# Patient Record
Sex: FEMALE | Race: WHITE | Employment: UNEMPLOYED | URBAN - METROPOLITAN AREA
[De-identification: names, ages, dates, MRNs, and addresses within clinical notes are randomized per-mention and may not be internally consistent; named-entity substitution may affect disease eponyms.]

---

## 2017-02-13 LAB
HPV COMMENT: ABNORMAL
HPV TYPE 16: NOT DETECTED
HPV TYPE 18: NOT DETECTED
HPVOH (OTHER TYPES): DETECTED

## 2019-06-09 ENCOUNTER — HOSPITAL ENCOUNTER (EMERGENCY)
Age: 34
Discharge: HOME | End: 2019-06-09
Attending: EMERGENCY MEDICINE
Payer: COMMERCIAL

## 2019-06-09 VITALS
WEIGHT: 125.66 LBS | HEIGHT: 66 IN | HEART RATE: 91 BPM | RESPIRATION RATE: 25 BRPM | DIASTOLIC BLOOD PRESSURE: 72 MMHG | TEMPERATURE: 98 F | BODY MASS INDEX: 20.2 KG/M2 | OXYGEN SATURATION: 100 % | SYSTOLIC BLOOD PRESSURE: 112 MMHG

## 2019-06-09 DIAGNOSIS — T18.108A FOREIGN BODY IN ESOPHAGUS, INITIAL ENCOUNTER: Primary | ICD-10-CM

## 2019-06-09 LAB — HCG(URINE) PREGNANCY TEST: NEGATIVE

## 2019-06-09 PROCEDURE — 6360000002 HC RX W HCPCS: Performed by: INTERNAL MEDICINE

## 2019-06-09 PROCEDURE — 2580000003 HC RX 258: Performed by: EMERGENCY MEDICINE

## 2019-06-09 PROCEDURE — 2709999900 HC NON-CHARGEABLE SUPPLY: Performed by: INTERNAL MEDICINE

## 2019-06-09 PROCEDURE — 3609012400 HC EGD TRANSORAL BIOPSY SINGLE/MULTIPLE: Performed by: INTERNAL MEDICINE

## 2019-06-09 PROCEDURE — 3609012900 HC EGD FOREIGN BODY REMOVAL: Performed by: INTERNAL MEDICINE

## 2019-06-09 PROCEDURE — 96375 TX/PRO/DX INJ NEW DRUG ADDON: CPT

## 2019-06-09 PROCEDURE — 99284 EMERGENCY DEPT VISIT MOD MDM: CPT

## 2019-06-09 PROCEDURE — 88305 TISSUE EXAM BY PATHOLOGIST: CPT

## 2019-06-09 PROCEDURE — 6360000002 HC RX W HCPCS: Performed by: EMERGENCY MEDICINE

## 2019-06-09 PROCEDURE — 96374 THER/PROPH/DIAG INJ IV PUSH: CPT

## 2019-06-09 PROCEDURE — 99152 MOD SED SAME PHYS/QHP 5/>YRS: CPT | Performed by: INTERNAL MEDICINE

## 2019-06-09 PROCEDURE — 88313 SPECIAL STAINS GROUP 2: CPT

## 2019-06-09 PROCEDURE — 84703 CHORIONIC GONADOTROPIN ASSAY: CPT

## 2019-06-09 RX ORDER — MIDAZOLAM HYDROCHLORIDE 1 MG/ML
INJECTION INTRAMUSCULAR; INTRAVENOUS
Status: COMPLETED | OUTPATIENT
Start: 2019-06-09 | End: 2019-06-09

## 2019-06-09 RX ORDER — MEPERIDINE HYDROCHLORIDE 100 MG/ML
100 INJECTION INTRAMUSCULAR; INTRAVENOUS; SUBCUTANEOUS ONCE
Status: COMPLETED | OUTPATIENT
Start: 2019-06-09 | End: 2019-06-09

## 2019-06-09 RX ORDER — MEPERIDINE HYDROCHLORIDE 25 MG/ML
INJECTION INTRAMUSCULAR; INTRAVENOUS; SUBCUTANEOUS
Status: COMPLETED | OUTPATIENT
Start: 2019-06-09 | End: 2019-06-09

## 2019-06-09 RX ORDER — MORPHINE SULFATE 2 MG/ML
4 INJECTION, SOLUTION INTRAMUSCULAR; INTRAVENOUS ONCE
Status: COMPLETED | OUTPATIENT
Start: 2019-06-09 | End: 2019-06-09

## 2019-06-09 RX ORDER — ONDANSETRON 2 MG/ML
4 INJECTION INTRAMUSCULAR; INTRAVENOUS ONCE
Status: COMPLETED | OUTPATIENT
Start: 2019-06-09 | End: 2019-06-09

## 2019-06-09 RX ORDER — SODIUM CHLORIDE 9 MG/ML
INJECTION, SOLUTION INTRAVENOUS CONTINUOUS
Status: DISCONTINUED | OUTPATIENT
Start: 2019-06-09 | End: 2019-06-09 | Stop reason: HOSPADM

## 2019-06-09 RX ADMIN — MEPERIDINE HYDROCHLORIDE 100 MG: 100 INJECTION, SOLUTION INTRAMUSCULAR; INTRAVENOUS; SUBCUTANEOUS at 10:10

## 2019-06-09 RX ADMIN — ONDANSETRON 4 MG: 2 INJECTION INTRAMUSCULAR; INTRAVENOUS at 08:00

## 2019-06-09 RX ADMIN — MORPHINE SULFATE 4 MG: 2 INJECTION, SOLUTION INTRAMUSCULAR; INTRAVENOUS at 07:59

## 2019-06-09 RX ADMIN — SODIUM CHLORIDE: 9 INJECTION, SOLUTION INTRAVENOUS at 08:00

## 2019-06-09 ASSESSMENT — PAIN DESCRIPTION - PAIN TYPE: TYPE: ACUTE PAIN

## 2019-06-09 ASSESSMENT — PAIN - FUNCTIONAL ASSESSMENT: PAIN_FUNCTIONAL_ASSESSMENT: 0-10

## 2019-06-09 ASSESSMENT — PAIN DESCRIPTION - DESCRIPTORS: DESCRIPTORS: NAGGING

## 2019-06-09 ASSESSMENT — PAIN SCALES - GENERAL
PAINLEVEL_OUTOF10: 10
PAINLEVEL_OUTOF10: 0
PAINLEVEL_OUTOF10: 10
PAINLEVEL_OUTOF10: 0

## 2019-06-09 ASSESSMENT — PAIN DESCRIPTION - FREQUENCY: FREQUENCY: CONTINUOUS

## 2019-06-09 ASSESSMENT — PAIN DESCRIPTION - LOCATION: LOCATION: STERNUM

## 2019-06-09 NOTE — H&P
[address] [place] [address]   Pre-operative History and Physical    Patient: Jim Bhatia  : 1985     History Obtained From:  patient    HISTORY OF PRESENT ILLNESS:    The patient is a 29 y.o. female who presents for an EGD for esophageal foreign body. She was eating steak last night and it got stuck. One prior incident like this with chicken and she was able to vomit it up. She has frequent heartburn - not previously evaluated and ross not take medicine for it. Denies any food allergies. Past Medical History:        Diagnosis Date    Anxiety      Past Surgical History:    History reviewed. No pertinent surgical history. Medications Prior to Admission:   No current facility-administered medications on file prior to encounter. No current outpatient medications on file prior to encounter. Allergies:  Pcn [penicillins]     History of allergic reaction to anesthesia:  No    Social History:   Nonsmoker, occasional EtOH    Family History:   Noncontributory    PHYSICAL EXAM:      /78   Pulse 80   Temp 98 °F (36.7 °C)   Resp 16   Ht 5' 6\" (1.676 m)   Wt 125 lb 10.6 oz (57 kg)   LMP 2019   SpO2 100%   BMI 20.28 kg/m²  I        Heart:  No m/r/g +s1/s2 RRR    Lungs:  CTA bilaterally    Abdomen:  Soft, nontender, non distended; +bs    ASA Grade:  ASA 1 - Normal health patient    Mallampati Class:  Class I: Soft palate, uvula, fauces, pillars visible  __x________  Class II: Soft palate, uvula, fauces visible  __________   Class III: Soft palate, base of uvula visible  __________  Class IV: Hard palate only visible   __________      ASSESSMENT AND PLAN:    1. Patient is a 29 y.o. female here for EGD with conscious sedation  2. Procedure options, risks and benefits reviewed with patient. We specifically discussed that risks include, but are not limited to infection, bleeding, perforation, death, and missed lesions. Patient expresses understanding.

## 2019-06-09 NOTE — ED NOTES
Acknowledged pt by pt's name. Verified pt by name and date of birth. Checked arm band, allergies, reviewed past medical history. Introduced myself to patient  Duration of ED plan of care explained to patient  Explained planned tests and procedures  Thanked patient for coming to [facility].    Asked if there was anything else I could do for the patient before exiting room. CB in reach.      Hilda Mclain RN  06/09/19 [address], RN  06/09/19 6393

## 2019-06-09 NOTE — OP NOTE
[facility]  EGD REPORT    Patient:  Tamie Shearer                  1985    Referring Physician:  None listed    Endoscopist: Sameer Greco     Indication:  Foreign body in esophagus     Medications:  Demerol 75 mg  Versed: 5  mg      Pre-Anesthesia Assessment:  I have reviewed and am in agreement with patient history and medication, including previous response to sedation. Prior to the procedure, a History and Physical was performed, and patient medications and allergies were reviewed. The patient is competent. The risks and benefits of the procedure and the sedation options and risks were discussed with the patient. Risks discussed included but were not limited to infection, bleeding, perforation, death, and missed lesions. All questions were answered and informed consent was obtained. Patient identification and proposed procedure were verified by the physician and the nurse in the pre-procedure area in the procedure room. Mallampatti: I  ASA Grade Assessment: 1     After reviewing the risks and benefits, the patient was deemed in satisfactory condition to undergo the procedure. The anesthesia plan was to use local sedation for anesthesia. Immediately prior to administration of medications, the patient was re-assessed for adequacy to receive sedatives and a time out was performed. Patient and healthcare providers were in agreement it was the correct patient and procedure. The heart rate, respiratory rate, oxygen saturations, blood pressure, adequacy of pulmonary ventilation, and response to care were monitored throughout the procedure. The physical status of the patient was re-assessed after the procedure. After obtaining informed consent, the endoscope was passed under direct vision. The endoscope was introduced through the mouth, and advanced to the second part of duodenum. The EGD was accomplished without difficulty. The patient tolerated the procedure well.        Duodenum: not examined  Stomach: entirely filled with food   Esophagus: meat identified and pushed through into stomach, visible reflux and patient vomited during the exam; two biopsies taken prior to emesis; exam aborted after vomiting    Estimated blood loss small    Plan:  Probable EoE  Will start PPI   Needs to follow up in GI clinic here this week  The patient knows it is their responsibility to call for biopsy results in 7 days

## 2019-06-09 NOTE — ED NOTES
Bed: A-15  Expected date:   Expected time:   Means of arrival:   Comments:  [id number] Valerio RN  06/09/19 7255

## 2019-06-09 NOTE — ED NOTES
Pt tolerated endo procedure well. Emesis x 1 during procedure. Father at bedside. Junior MIXON updated father.      Olive Bruno, RN  06/09/19 8902

## 2019-06-09 NOTE — ED PROVIDER NOTES
[facility]  eMERGENCY dEPARTMENT eNCOUnter      Pt Name: Cynthia Art  MRN: [id number]  [place] 1985  Date of evaluation: 6/9/2019  Provider: Evgeny Francois MD    CHIEF COMPLAINT       Chief Complaint   Patient presents with    Foreign Body     steak stuck in throat         CRITICAL CARE TIME   Total Critical Care time was 0 minutes, excluding separately reportable procedures. There was a high probability of clinically significant/life threatening deterioration in the patient's condition which required my urgent intervention. HISTORY OF PRESENT ILLNESS  (Location/Symptom, Timing/Onset, Context/Setting, Quality, Duration, Modifying Factors, Severity.)   Cynthia Art is a 29 y.o. female who presents to the emergency department complaining of a piece of steak being stuck. She was eating steak around 10 PM last night and states she swallowed a large piece. She states it felt like he got stuck. She indicates discomfort in her lower sternal area just above her epigastrium. She states she's been up all night, she cannot swallow her own saliva. She tried to eat a piece of bread, but vomited it back up. She denies any shortness of breath. She has lower sternal pain. The pain is nonradiating. He has no previous history of esophageal foreign body. She denies any other medical problems. Nursing Notes were reviewed and I agree. REVIEW OF SYSTEMS    (2-9 systems for level 4, 10 or more for level 5)     Gen.: No fever or chills. Cardiovascular: Lower sternal sharp chest discomfort which is nonradiating. Pulmonary: No shortness of breath. GI: Nausea, no vomiting. No abdominal pain. Except as noted above the remainder of the review of systems was reviewed and negative. PAST MEDICAL HISTORY     Past Medical History:   Diagnosis Date    Anxiety          SURGICAL HISTORY     History reviewed. No pertinent surgical history.       CURRENT MEDICATIONS Previous Medications    No medications on file       ALLERGIES     Pcn [penicillins]    FAMILY HISTORY     History reviewed. No pertinent family history. SOCIAL HISTORY       Social History     Socioeconomic History    Marital status: Single     Spouse name: None    Number of children: None    Years of education: None    Highest education level: None   Occupational History    None   Social Needs    Financial resource strain: None    Food insecurity:     Worry: None     Inability: None    Transportation needs:     Medical: None     Non-medical: None   Tobacco Use    Smoking status: Never Smoker    Smokeless tobacco: Never Used   Substance and Sexual Activity    Alcohol use: No    Drug use: No    Sexual activity: Yes     Partners: Male   Lifestyle    Physical activity:     Days per week: None     Minutes per session: None    Stress: None   Relationships    Social connections:     Talks on phone: None     Gets together: None     Attends Religion service: None     Active member of club or organization: None     Attends meetings of clubs or organizations: None     Relationship status: None    Intimate partner violence:     Fear of current or ex partner: None     Emotionally abused: None     Physically abused: None     Forced sexual activity: None   Other Topics Concern    None   Social History Narrative    None         PHYSICAL EXAM    (up to 7 for level 4, 8 or more for level 5)     ED Triage Vitals [06/09/19 0742]   BP Temp Temp Source Pulse Resp SpO2 Height Weight   (!) 140/93 -- Oral 78 -- -- 5' 6\" (1.676 m) 125 lb 10.6 oz (57 kg)       Gen. : Alert well-appearing female, anxious, no distress. Head: Atraumatic and normocephalic. Eyes: No conjunctival injection. Pupils equal round reactive. ENT: Adalberto [place] is clear. Oropharynx is moist without erythema. Neck: Supple without adenopathy. Nontender. No thyromegaly. Heart: Regular rate and rhythm. No murmurs gallops noted.   Lungs: Breath sounds equal bilaterally and clear. Abdomen: Soft, nondistended, nontender. No masses organomegaly. Musculoskeletal: No extremity edema. Intact symmetrical distal pulses. Skin: Warm and dry, good turgor. No cyanosis or diaphoresis. Neuro: Awake, alert, oriented. No focal motor deficits. Normal gait. Mental status: Normal affect. DIFFERENTIAL DIAGNOSIS   The patient presents after swallowing a large piece of steak and feeling like it got stuck. Since then she's not been able to swallow her own saliva. She has lower sternal chest discomfort. She is in no respiratory distress. GI will be consult for removal of an esophageal foreign body. DIAGNOSTIC RESULTS     EKG: All EKG's are interpreted by Jesse Carcamo MD in the absence of a cardiologist.      RADIOLOGY:   Non-plain film images such as CT, Ultrasound and MRI are read by the radiologist. Plain radiographic images are visualized and preliminarily interpreted Jesse Carcamo MD with the below findings:      Interpretation per the Radiologist below, if available at the time of this note:    No orders to display         ED BEDSIDE ULTRASOUND:   Performed by ED Physician - none    LABS:  Labs Reviewed   PREGNANCY, URINE    Narrative:     Performed at:  [address] [address]   Phone [phone]   SURGICAL PATHOLOGY       All other labs were within normal range or not returned as of this dictation. EMERGENCY DEPARTMENT COURSE and DIFFERENTIAL DIAGNOSIS/MDM:   Vitals:    Vitals:    06/09/19 1051 06/09/19 1108 06/09/19 1128 06/09/19 1140   BP: 104/62 122/85 110/72 112/72   Pulse: 75 103 82 91   Resp: 17 20 16 25   Temp:  98 °F (36.7 °C)  98 °F (36.7 °C)   TempSrc:  Oral  Oral   SpO2: 94% 100% 100% 100%   Weight:       Height:           0750:  Discussed with Dr. Ciera Wade , she will call an endoscopy and began within a couple of hours to remove the esophageal foreign body.  The patient will be started on some IV fluids. She'll be given morphine and Zofran for her discomfort and nausea. 1150: The patient been evaluated by GI. Her foreign body was removed by endoscopy. She's been fully recovered. She is awake, alert, oriented. He is drinking fluids. She's been up ambulating. She is ready for discharge. Her vital signs are stable. She will be instructed to follow-up with the GI physician per GI MD recommendation. CONSULTS:  IP CONSULT TO GI    PROCEDURES:  None    FINAL IMPRESSION      1.  Foreign body in esophagus, initial encounter          DISPOSITION/PLAN   DISPOSITION        PATIENT REFERRED TO:  Harvey Craig MD  [address], [facility]  [phone]    Schedule an appointment as soon as possible for a visit   As instructed by Dr. Kat Santana:  New Prescriptions    No medications on file       (Please note that portions of this note were completed with a voice recognition program.  Efforts were made to edit the dictations but occasionally words are mis-transcribed.)    Carine Marin MD  Attending Emergency Physician        Mil Perez MD  06/09/19 3152

## 2019-12-14 LAB — URINE CULTURE, ROUTINE: NORMAL

## 2023-01-13 LAB — PREGNANCY, URINE: NEGATIVE

## (undated) DEVICE — BITE BLK 60FR GRN ENDOSCP AD W STRP SLD DISPOSABLE

## (undated) DEVICE — CONTAINER SPEC 480ML CLR POLYSTYR 10% NEUT BUFF FRMLN ZN

## (undated) DEVICE — FORCEPS BX 240CM 2.4MM L NDL RAD JAW 4 M00513334

## (undated) DEVICE — ENDOSCOPY KIT: Brand: MEDLINE INDUSTRIES, INC.